# Patient Record
Sex: FEMALE | Race: BLACK OR AFRICAN AMERICAN | URBAN - METROPOLITAN AREA
[De-identification: names, ages, dates, MRNs, and addresses within clinical notes are randomized per-mention and may not be internally consistent; named-entity substitution may affect disease eponyms.]

---

## 2020-02-03 ENCOUNTER — IMPORTED ENCOUNTER (OUTPATIENT)
Dept: URBAN - METROPOLITAN AREA CLINIC 50 | Facility: CLINIC | Age: 55
End: 2020-02-03

## 2020-02-04 ENCOUNTER — IMPORTED ENCOUNTER (OUTPATIENT)
Dept: URBAN - METROPOLITAN AREA CLINIC 50 | Facility: CLINIC | Age: 55
End: 2020-02-04

## 2020-02-11 ENCOUNTER — IMPORTED ENCOUNTER (OUTPATIENT)
Dept: URBAN - METROPOLITAN AREA CLINIC 50 | Facility: CLINIC | Age: 55
End: 2020-02-11

## 2020-02-12 ENCOUNTER — IMPORTED ENCOUNTER (OUTPATIENT)
Dept: URBAN - METROPOLITAN AREA CLINIC 50 | Facility: CLINIC | Age: 55
End: 2020-02-12

## 2020-02-25 ENCOUNTER — IMPORTED ENCOUNTER (OUTPATIENT)
Dept: URBAN - METROPOLITAN AREA CLINIC 50 | Facility: CLINIC | Age: 55
End: 2020-02-25

## 2020-03-05 ENCOUNTER — IMPORTED ENCOUNTER (OUTPATIENT)
Dept: URBAN - METROPOLITAN AREA CLINIC 50 | Facility: CLINIC | Age: 55
End: 2020-03-05

## 2020-03-06 ENCOUNTER — IMPORTED ENCOUNTER (OUTPATIENT)
Dept: URBAN - METROPOLITAN AREA CLINIC 50 | Facility: CLINIC | Age: 55
End: 2020-03-06

## 2020-08-25 NOTE — PATIENT DISCUSSION
Cataract surgery has been performed in the first eye and activities of daily living are still impaired. The patient would like to proceed with cataract surgery in the second eye as scheduled. The patient elects TMF +3.25 OD, goal of Emmetropia.  dec for Sx OD made today with LIZ.

## 2020-08-25 NOTE — PATIENT DISCUSSION
Patient advised of the right to post-operative care by the surgeon. Patient is fully informed of, and agreed to, co-management with their primary optometric physician. Post-operative care by the surgeon is not medically necessary and co-management is clinically appropriate. Patient has received itemization of fees related to cataract surgery. Transfer of care letter completed for the patient. Transfer care of both eyes to Dr. Amador Quispe on 8/25/2020. Patient instructed to call immediately if any new distortion, blurring, decreased vision or eye pain.

## 2021-02-01 NOTE — PATIENT DISCUSSION
Pt will see [facility] for a confirmatory MR and will then get a contact lens trial to improve distance and near vision. Once MR is stable and contact lens trial is complete with goal in place OU, pt will see [facility] for Lasik enhancement.

## 2021-02-01 NOTE — PATIENT DISCUSSION
No Retinal holes, Tears or Detachments. Progress Notes by Cheryl Galvin RDCS at 08/14/18 03:42 PM     Author:  Cheryl Galvin RDCS Service:  (none) Author Type:  Technician     Filed:  08/14/18 03:43 PM Encounter Date:  8/14/2018 Status:  Signed     :  Cheryl Galvin RDCS (Technician)            Echocardiogram results forwarded to the reading physician for review.[MS1.1T]  ROCIO[MS1.1M]        Revision History        User Key Date/Time User Provider Type Action    > MS1.1 08/14/18 03:43 PM Cheryl Galvin RDCS Technician Sign    M - Manual, T - Template

## 2021-02-08 NOTE — PATIENT DISCUSSION
CL trial to see if patient appreciates increase in va for possible LVC OU.  RTC in 2 days for cl fu.

## 2021-03-25 NOTE — PATIENT DISCUSSION
3/25/21 JOD: Discused iLASIK vs epi-LASEK with patient today. Patient elects iLASIK OU tgt tu. Appreciated CTL trial with Dr. Oj Cruz. Advised patient of no eye rubbing.

## 2021-04-17 ASSESSMENT — VISUAL ACUITY
OD_SC: 20/25-1
OD_SC: 20/30-1
OS_SC: 20/25
OS_SC: 20/25-1

## 2021-04-17 ASSESSMENT — TONOMETRY
OS_IOP_MMHG: 14
OD_IOP_MMHG: 14
OD_IOP_MMHG: 14
OS_IOP_MMHG: 15

## 2021-06-01 NOTE — PATIENT DISCUSSION
3/25/21 JOD: Discused iLASIK vs epi-LASEK with patient today. Patient elects iLASIK OU jyotsna mae. Appreciated CTL trial with Dr. Pak Officer. Advised patient of no eye rubbing.

## 2023-02-02 NOTE — PATIENT DISCUSSION
Retinal tear and detachment warning symptoms reviewed. Detail Level: Detailed Medical Necessity Clause: This procedure was medically necessary because the lesions that were treated were: Spray Paint Text: The liquid nitrogen was applied to the skin utilizing a spray paint frosting technique. Show Applicator Variable?: Yes Duration Of Freeze Thaw-Cycle (Seconds): 3 Post-Care Instructions: I reviewed with the patient in detail post-care instructions. Patient is to wear sunprotection, and avoid picking at any of the treated lesions. Pt may apply Vaseline to crusted or scabbing areas. Consent: The patient's consent was obtained including but not limited to risks of crusting, scabbing, blistering, scarring, darker or lighter pigmentary change, recurrence, incomplete removal and infection. Medical Necessity Information: It is in your best interest to select a reason for this procedure from the list below. All of these items fulfill various CMS LCD requirements except the new and changing color options. Add 52 Modifier (Optional): no Number Of Freeze-Thaw Cycles: 4 freeze-thaw cycles

## 2023-07-28 NOTE — PATIENT DISCUSSION
- No acute issues   - Continue home gabapentin    Certain medications including Flomax and other similar medications may cause the iris to become floppy during the cataract surgery and may add complexity to the surgery and increase the risk for complications.